# Patient Record
(demographics unavailable — no encounter records)

---

## 2017-06-12 RX ORDER — ALBUTEROL SULFATE 108 UG/1
AEROSOL, METERED RESPIRATORY (INHALATION)
Qty: 6.7 INHALER | Refills: 2 | Status: SHIPPED | OUTPATIENT
Start: 2017-06-12 | End: 2018-05-04 | Stop reason: SDUPTHER

## 2017-12-20 DIAGNOSIS — E03.9 ACQUIRED HYPOTHYROIDISM: ICD-10-CM

## 2017-12-20 RX ORDER — LEVOTHYROXINE SODIUM 200 UG/1
TABLET ORAL
Qty: 30 TAB | Refills: 0 | Status: SHIPPED | OUTPATIENT
Start: 2017-12-20 | End: 2018-05-02 | Stop reason: SDUPTHER

## 2017-12-20 NOTE — TELEPHONE ENCOUNTER
His prescription is approved but would need a follow up visit for further refills. Make it a 30 min visit with labs prior to the visit fasting.   If he agrees, let me know to order the labs ahead of time

## 2018-05-02 DIAGNOSIS — E03.9 ACQUIRED HYPOTHYROIDISM: ICD-10-CM

## 2018-05-02 RX ORDER — LEVOTHYROXINE SODIUM 200 UG/1
TABLET ORAL
Qty: 30 TAB | Refills: 0 | Status: SHIPPED | OUTPATIENT
Start: 2018-05-02 | End: 2018-05-30 | Stop reason: SDUPTHER

## 2018-05-02 NOTE — TELEPHONE ENCOUNTER
Please let the pharmacy he will get this refill but cannot get anymore unless he gets his blood checked and has an office visit. That was the last message but there is no documentation that the pharmacy was contacted. It seems his number is not valid.

## 2018-05-04 RX ORDER — ALBUTEROL SULFATE 108 UG/1
AEROSOL, METERED RESPIRATORY (INHALATION)
Qty: 6.7 INHALER | Refills: 0 | Status: SHIPPED | OUTPATIENT
Start: 2018-05-04

## 2018-05-30 DIAGNOSIS — E03.9 ACQUIRED HYPOTHYROIDISM: ICD-10-CM

## 2018-05-30 RX ORDER — LEVOTHYROXINE SODIUM 200 UG/1
TABLET ORAL
Qty: 30 TAB | Refills: 0 | Status: SHIPPED | OUTPATIENT
Start: 2018-05-30

## 2018-05-30 NOTE — TELEPHONE ENCOUNTER
Please inform patient he needs labs and office visit for refills after this. If unable to reach him, let pharmacy know. I think we tried to do this before but he still is not making an appointment.

## 2018-05-31 NOTE — TELEPHONE ENCOUNTER
Phone number on file for patient invalid. Spoke with eva Rao and asked that a note be put in the patient's chart stating no further refills without an office visit and lab work.